# Patient Record
Sex: FEMALE | Race: WHITE | Employment: FULL TIME | URBAN - METROPOLITAN AREA
[De-identification: names, ages, dates, MRNs, and addresses within clinical notes are randomized per-mention and may not be internally consistent; named-entity substitution may affect disease eponyms.]

---

## 2020-09-15 ENCOUNTER — HOSPITAL ENCOUNTER (OUTPATIENT)
Facility: CLINIC | Age: 50
Setting detail: OBSERVATION
Discharge: HOME | End: 2020-09-16
Attending: EMERGENCY MEDICINE | Admitting: INTERNAL MEDICINE
Payer: COMMERCIAL

## 2020-09-15 ENCOUNTER — APPOINTMENT (OUTPATIENT)
Dept: CT IMAGING | Facility: CLINIC | Age: 50
End: 2020-09-15
Attending: EMERGENCY MEDICINE
Payer: COMMERCIAL

## 2020-09-15 DIAGNOSIS — N20.0 NEPHROLITHIASIS: Primary | ICD-10-CM

## 2020-09-15 DIAGNOSIS — N13.2 HYDRONEPHROSIS WITH URINARY OBSTRUCTION DUE TO URETERAL CALCULUS: ICD-10-CM

## 2020-09-15 DIAGNOSIS — N20.0 KIDNEY STONE: ICD-10-CM

## 2020-09-15 LAB
ALBUMIN UR-MCNC: NEGATIVE MG/DL
AMORPH CRY #/AREA URNS HPF: ABNORMAL /HPF
ANION GAP SERPL CALCULATED.3IONS-SCNC: 4 MMOL/L (ref 3–14)
APPEARANCE UR: CLEAR
BASOPHILS # BLD AUTO: 0 10E9/L (ref 0–0.2)
BASOPHILS NFR BLD AUTO: 0.3 %
BILIRUB UR QL STRIP: NEGATIVE
BUN SERPL-MCNC: 16 MG/DL (ref 7–30)
CALCIUM SERPL-MCNC: 8.5 MG/DL (ref 8.5–10.1)
CHLORIDE SERPL-SCNC: 109 MMOL/L (ref 94–109)
CO2 SERPL-SCNC: 26 MMOL/L (ref 20–32)
COLOR UR AUTO: YELLOW
CREAT SERPL-MCNC: 0.9 MG/DL (ref 0.52–1.04)
DIFFERENTIAL METHOD BLD: NORMAL
EOSINOPHIL # BLD AUTO: 0.1 10E9/L (ref 0–0.7)
EOSINOPHIL NFR BLD AUTO: 0.6 %
ERYTHROCYTE [DISTWIDTH] IN BLOOD BY AUTOMATED COUNT: 12.7 % (ref 10–15)
GFR SERPL CREATININE-BSD FRML MDRD: 74 ML/MIN/{1.73_M2}
GLUCOSE SERPL-MCNC: 147 MG/DL (ref 70–99)
GLUCOSE UR STRIP-MCNC: NEGATIVE MG/DL
HCT VFR BLD AUTO: 42 % (ref 35–47)
HGB BLD-MCNC: 13.8 G/DL (ref 11.7–15.7)
HGB UR QL STRIP: ABNORMAL
IMM GRANULOCYTES # BLD: 0 10E9/L (ref 0–0.4)
IMM GRANULOCYTES NFR BLD: 0.2 %
KETONES UR STRIP-MCNC: NEGATIVE MG/DL
LEUKOCYTE ESTERASE UR QL STRIP: ABNORMAL
LYMPHOCYTES # BLD AUTO: 1.6 10E9/L (ref 0.8–5.3)
LYMPHOCYTES NFR BLD AUTO: 16.4 %
MAGNESIUM SERPL-MCNC: 2.2 MG/DL (ref 1.6–2.3)
MCH RBC QN AUTO: 30.9 PG (ref 26.5–33)
MCHC RBC AUTO-ENTMCNC: 32.9 G/DL (ref 31.5–36.5)
MCV RBC AUTO: 94 FL (ref 78–100)
MONOCYTES # BLD AUTO: 0.6 10E9/L (ref 0–1.3)
MONOCYTES NFR BLD AUTO: 5.9 %
MUCOUS THREADS #/AREA URNS LPF: PRESENT /LPF
NEUTROPHILS # BLD AUTO: 7.4 10E9/L (ref 1.6–8.3)
NEUTROPHILS NFR BLD AUTO: 76.6 %
NITRATE UR QL: NEGATIVE
NRBC # BLD AUTO: 0 10*3/UL
NRBC BLD AUTO-RTO: 0 /100
PH UR STRIP: 5 PH (ref 5–7)
PLATELET # BLD AUTO: 250 10E9/L (ref 150–450)
POTASSIUM SERPL-SCNC: 3.3 MMOL/L (ref 3.4–5.3)
RBC # BLD AUTO: 4.46 10E12/L (ref 3.8–5.2)
RBC #/AREA URNS AUTO: 3 /HPF (ref 0–2)
SODIUM SERPL-SCNC: 139 MMOL/L (ref 133–144)
SOURCE: ABNORMAL
SP GR UR STRIP: 1.03 (ref 1–1.03)
UROBILINOGEN UR STRIP-MCNC: 0.2 MG/DL (ref 0–2)
WBC # BLD AUTO: 9.6 10E9/L (ref 4–11)
WBC #/AREA URNS AUTO: 0 /HPF (ref 0–5)

## 2020-09-15 PROCEDURE — 25000128 H RX IP 250 OP 636: Performed by: EMERGENCY MEDICINE

## 2020-09-15 PROCEDURE — 96361 HYDRATE IV INFUSION ADD-ON: CPT

## 2020-09-15 PROCEDURE — 96375 TX/PRO/DX INJ NEW DRUG ADDON: CPT

## 2020-09-15 PROCEDURE — 85025 COMPLETE CBC W/AUTO DIFF WBC: CPT | Performed by: EMERGENCY MEDICINE

## 2020-09-15 PROCEDURE — 99220 ZZC INITIAL OBSERVATION CARE,LEVL III: CPT | Performed by: INTERNAL MEDICINE

## 2020-09-15 PROCEDURE — 96376 TX/PRO/DX INJ SAME DRUG ADON: CPT

## 2020-09-15 PROCEDURE — 84132 ASSAY OF SERUM POTASSIUM: CPT | Performed by: INTERNAL MEDICINE

## 2020-09-15 PROCEDURE — 80048 BASIC METABOLIC PNL TOTAL CA: CPT | Performed by: EMERGENCY MEDICINE

## 2020-09-15 PROCEDURE — 81001 URINALYSIS AUTO W/SCOPE: CPT | Performed by: EMERGENCY MEDICINE

## 2020-09-15 PROCEDURE — 99285 EMERGENCY DEPT VISIT HI MDM: CPT | Mod: 25

## 2020-09-15 PROCEDURE — 96374 THER/PROPH/DIAG INJ IV PUSH: CPT

## 2020-09-15 PROCEDURE — 25000132 ZZH RX MED GY IP 250 OP 250 PS 637: Performed by: EMERGENCY MEDICINE

## 2020-09-15 PROCEDURE — 74176 CT ABD & PELVIS W/O CONTRAST: CPT

## 2020-09-15 PROCEDURE — 83735 ASSAY OF MAGNESIUM: CPT | Performed by: EMERGENCY MEDICINE

## 2020-09-15 PROCEDURE — G0378 HOSPITAL OBSERVATION PER HR: HCPCS

## 2020-09-15 PROCEDURE — 25800030 ZZH RX IP 258 OP 636: Performed by: EMERGENCY MEDICINE

## 2020-09-15 PROCEDURE — 25000132 ZZH RX MED GY IP 250 OP 250 PS 637: Performed by: NURSE PRACTITIONER

## 2020-09-15 PROCEDURE — 25000128 H RX IP 250 OP 636: Performed by: NURSE PRACTITIONER

## 2020-09-15 PROCEDURE — 25000128 H RX IP 250 OP 636: Performed by: INTERNAL MEDICINE

## 2020-09-15 PROCEDURE — 25800030 ZZH RX IP 258 OP 636: Performed by: NURSE PRACTITIONER

## 2020-09-15 RX ORDER — ACETAMINOPHEN 325 MG/1
650 TABLET ORAL EVERY 4 HOURS PRN
Status: DISCONTINUED | OUTPATIENT
Start: 2020-09-15 | End: 2020-09-16 | Stop reason: HOSPADM

## 2020-09-15 RX ORDER — HYDROMORPHONE HYDROCHLORIDE 1 MG/ML
0.5 INJECTION, SOLUTION INTRAMUSCULAR; INTRAVENOUS; SUBCUTANEOUS
Status: COMPLETED | OUTPATIENT
Start: 2020-09-15 | End: 2020-09-15

## 2020-09-15 RX ORDER — POTASSIUM CHLORIDE 1500 MG/1
20-40 TABLET, EXTENDED RELEASE ORAL
Status: DISCONTINUED | OUTPATIENT
Start: 2020-09-15 | End: 2020-09-16 | Stop reason: HOSPADM

## 2020-09-15 RX ORDER — POTASSIUM CHLORIDE 1.5 G/1.58G
20-40 POWDER, FOR SOLUTION ORAL
Status: DISCONTINUED | OUTPATIENT
Start: 2020-09-15 | End: 2020-09-16 | Stop reason: HOSPADM

## 2020-09-15 RX ORDER — SODIUM CHLORIDE 9 MG/ML
INJECTION, SOLUTION INTRAVENOUS CONTINUOUS
Status: DISCONTINUED | OUTPATIENT
Start: 2020-09-15 | End: 2020-09-16

## 2020-09-15 RX ORDER — TAMSULOSIN HYDROCHLORIDE 0.4 MG/1
0.4 CAPSULE ORAL ONCE
Status: COMPLETED | OUTPATIENT
Start: 2020-09-15 | End: 2020-09-15

## 2020-09-15 RX ORDER — MULTIPLE VITAMINS W/ MINERALS TAB 9MG-400MCG
1 TAB ORAL DAILY
Status: ON HOLD | COMMUNITY
End: 2020-09-16

## 2020-09-15 RX ORDER — FEXOFENADINE HCL 180 MG/1
180 TABLET ORAL DAILY
Status: ON HOLD | COMMUNITY
End: 2020-09-16

## 2020-09-15 RX ORDER — KETOROLAC TROMETHAMINE 30 MG/ML
30 INJECTION, SOLUTION INTRAMUSCULAR; INTRAVENOUS EVERY 6 HOURS PRN
Status: DISCONTINUED | OUTPATIENT
Start: 2020-09-15 | End: 2020-09-15

## 2020-09-15 RX ORDER — HYDROMORPHONE HYDROCHLORIDE 1 MG/ML
0.5 INJECTION, SOLUTION INTRAMUSCULAR; INTRAVENOUS; SUBCUTANEOUS
Status: DISCONTINUED | OUTPATIENT
Start: 2020-09-15 | End: 2020-09-16 | Stop reason: HOSPADM

## 2020-09-15 RX ORDER — ONDANSETRON 2 MG/ML
4 INJECTION INTRAMUSCULAR; INTRAVENOUS EVERY 6 HOURS PRN
Status: DISCONTINUED | OUTPATIENT
Start: 2020-09-15 | End: 2020-09-16 | Stop reason: HOSPADM

## 2020-09-15 RX ORDER — TAMSULOSIN HYDROCHLORIDE 0.4 MG/1
0.4 CAPSULE ORAL DAILY
Status: DISCONTINUED | OUTPATIENT
Start: 2020-09-16 | End: 2020-09-16 | Stop reason: HOSPADM

## 2020-09-15 RX ORDER — MAGNESIUM SULFATE HEPTAHYDRATE 40 MG/ML
4 INJECTION, SOLUTION INTRAVENOUS EVERY 4 HOURS PRN
Status: DISCONTINUED | OUTPATIENT
Start: 2020-09-15 | End: 2020-09-16 | Stop reason: HOSPADM

## 2020-09-15 RX ORDER — POTASSIUM CHLORIDE 7.45 MG/ML
10 INJECTION INTRAVENOUS
Status: DISCONTINUED | OUTPATIENT
Start: 2020-09-15 | End: 2020-09-16 | Stop reason: HOSPADM

## 2020-09-15 RX ORDER — SODIUM CHLORIDE 9 MG/ML
INJECTION, SOLUTION INTRAVENOUS CONTINUOUS
Status: DISCONTINUED | OUTPATIENT
Start: 2020-09-15 | End: 2020-09-15 | Stop reason: DRUGHIGH

## 2020-09-15 RX ORDER — POTASSIUM CL/LIDO/0.9 % NACL 10MEQ/0.1L
10 INTRAVENOUS SOLUTION, PIGGYBACK (ML) INTRAVENOUS
Status: DISCONTINUED | OUTPATIENT
Start: 2020-09-15 | End: 2020-09-16 | Stop reason: HOSPADM

## 2020-09-15 RX ORDER — ACETAMINOPHEN 650 MG/1
650 SUPPOSITORY RECTAL EVERY 4 HOURS PRN
Status: DISCONTINUED | OUTPATIENT
Start: 2020-09-15 | End: 2020-09-16 | Stop reason: HOSPADM

## 2020-09-15 RX ORDER — ONDANSETRON 2 MG/ML
4 INJECTION INTRAMUSCULAR; INTRAVENOUS EVERY 30 MIN PRN
Status: DISCONTINUED | OUTPATIENT
Start: 2020-09-15 | End: 2020-09-15 | Stop reason: DRUGHIGH

## 2020-09-15 RX ORDER — HYDROCODONE BITARTRATE AND ACETAMINOPHEN 5; 325 MG/1; MG/1
1 TABLET ORAL ONCE
Status: COMPLETED | OUTPATIENT
Start: 2020-09-15 | End: 2020-09-15

## 2020-09-15 RX ORDER — NALOXONE HYDROCHLORIDE 0.4 MG/ML
.1-.4 INJECTION, SOLUTION INTRAMUSCULAR; INTRAVENOUS; SUBCUTANEOUS
Status: DISCONTINUED | OUTPATIENT
Start: 2020-09-15 | End: 2020-09-16 | Stop reason: HOSPADM

## 2020-09-15 RX ORDER — BIOTIN 10000 MCG
CAPSULE ORAL
COMMUNITY
End: 2020-09-15

## 2020-09-15 RX ORDER — TAMSULOSIN HYDROCHLORIDE 0.4 MG/1
0.4 CAPSULE ORAL DAILY
Qty: 10 CAPSULE | Refills: 0 | Status: SHIPPED | OUTPATIENT
Start: 2020-09-15 | End: 2020-09-16

## 2020-09-15 RX ORDER — MAGNESIUM SULFATE HEPTAHYDRATE 40 MG/ML
2 INJECTION, SOLUTION INTRAVENOUS DAILY PRN
Status: DISCONTINUED | OUTPATIENT
Start: 2020-09-15 | End: 2020-09-16 | Stop reason: HOSPADM

## 2020-09-15 RX ORDER — POTASSIUM CHLORIDE 29.8 MG/ML
20 INJECTION INTRAVENOUS
Status: DISCONTINUED | OUTPATIENT
Start: 2020-09-15 | End: 2020-09-16 | Stop reason: HOSPADM

## 2020-09-15 RX ORDER — HYDROMORPHONE HYDROCHLORIDE 1 MG/ML
0.2 INJECTION, SOLUTION INTRAMUSCULAR; INTRAVENOUS; SUBCUTANEOUS
Status: DISCONTINUED | OUTPATIENT
Start: 2020-09-15 | End: 2020-09-15

## 2020-09-15 RX ORDER — HYDROCODONE BITARTRATE AND ACETAMINOPHEN 5; 325 MG/1; MG/1
1 TABLET ORAL EVERY 6 HOURS PRN
Qty: 10 TABLET | Refills: 0 | Status: SHIPPED | OUTPATIENT
Start: 2020-09-15 | End: 2020-09-16

## 2020-09-15 RX ORDER — ONDANSETRON 4 MG/1
4 TABLET, ORALLY DISINTEGRATING ORAL EVERY 8 HOURS PRN
Qty: 10 TABLET | Refills: 0 | Status: SHIPPED | OUTPATIENT
Start: 2020-09-15 | End: 2020-09-16

## 2020-09-15 RX ORDER — KETOROLAC TROMETHAMINE 30 MG/ML
30 INJECTION, SOLUTION INTRAMUSCULAR; INTRAVENOUS ONCE
Status: COMPLETED | OUTPATIENT
Start: 2020-09-15 | End: 2020-09-15

## 2020-09-15 RX ORDER — GLUCOSAMINE/D3/BOSWELLIA SERRA 1500MG-400
10000 TABLET ORAL EVERY MORNING
Status: ON HOLD | COMMUNITY
End: 2020-09-16

## 2020-09-15 RX ORDER — ONDANSETRON 4 MG/1
4 TABLET, ORALLY DISINTEGRATING ORAL EVERY 6 HOURS PRN
Status: DISCONTINUED | OUTPATIENT
Start: 2020-09-15 | End: 2020-09-16 | Stop reason: HOSPADM

## 2020-09-15 RX ADMIN — ONDANSETRON 4 MG: 2 INJECTION INTRAMUSCULAR; INTRAVENOUS at 15:54

## 2020-09-15 RX ADMIN — ACETAMINOPHEN 650 MG: 325 TABLET, FILM COATED ORAL at 19:24

## 2020-09-15 RX ADMIN — Medication 10 MEQ: at 16:54

## 2020-09-15 RX ADMIN — KETOROLAC TROMETHAMINE 30 MG: 30 INJECTION, SOLUTION INTRAMUSCULAR at 08:35

## 2020-09-15 RX ADMIN — HYDROMORPHONE HYDROCHLORIDE 0.2 MG: 1 INJECTION, SOLUTION INTRAMUSCULAR; INTRAVENOUS; SUBCUTANEOUS at 15:51

## 2020-09-15 RX ADMIN — ONDANSETRON 4 MG: 2 INJECTION INTRAMUSCULAR; INTRAVENOUS at 10:10

## 2020-09-15 RX ADMIN — HYDROMORPHONE HYDROCHLORIDE 0.5 MG: 1 INJECTION, SOLUTION INTRAMUSCULAR; INTRAVENOUS; SUBCUTANEOUS at 08:06

## 2020-09-15 RX ADMIN — HYDROMORPHONE HYDROCHLORIDE 0.5 MG: 1 INJECTION, SOLUTION INTRAMUSCULAR; INTRAVENOUS; SUBCUTANEOUS at 10:11

## 2020-09-15 RX ADMIN — HYDROMORPHONE HYDROCHLORIDE 0.3 MG: 1 INJECTION, SOLUTION INTRAMUSCULAR; INTRAVENOUS; SUBCUTANEOUS at 16:25

## 2020-09-15 RX ADMIN — TAMSULOSIN HYDROCHLORIDE 0.4 MG: 0.4 CAPSULE ORAL at 11:32

## 2020-09-15 RX ADMIN — ONDANSETRON 4 MG: 2 INJECTION INTRAMUSCULAR; INTRAVENOUS at 08:06

## 2020-09-15 RX ADMIN — Medication 10 MEQ: at 19:37

## 2020-09-15 RX ADMIN — HYDROCODONE BITARTRATE AND ACETAMINOPHEN 1 TABLET: 5; 325 TABLET ORAL at 11:32

## 2020-09-15 RX ADMIN — Medication 10 MEQ: at 15:31

## 2020-09-15 RX ADMIN — Medication 10 MEQ: at 18:18

## 2020-09-15 RX ADMIN — SODIUM CHLORIDE: 9 INJECTION, SOLUTION INTRAVENOUS at 14:25

## 2020-09-15 RX ADMIN — HYDROMORPHONE HYDROCHLORIDE 0.5 MG: 1 INJECTION, SOLUTION INTRAMUSCULAR; INTRAVENOUS; SUBCUTANEOUS at 10:46

## 2020-09-15 RX ADMIN — SODIUM CHLORIDE 1000 ML: 9 INJECTION, SOLUTION INTRAVENOUS at 09:29

## 2020-09-15 ASSESSMENT — ENCOUNTER SYMPTOMS
BLOOD IN STOOL: 0
DIFFICULTY URINATING: 1
FEVER: 0
DIARRHEA: 1
FLANK PAIN: 1

## 2020-09-15 NOTE — PHARMACY-ADMISSION MEDICATION HISTORY
Pharmacy Medication History  Admission medication history interview status for the 9/15/2020  admission is complete. See EPIC admission navigator for prior to admission medications     Medication history sources: Patient  Medication history source reliability: Good  Adherence assessment: Good    Significant changes made to the medication list:        Additional medication history information:       Medication reconciliation completed by provider prior to medication history? No    Time spent in this activity: 5 min      Prior to Admission medications    Medication Sig Last Dose Taking? Auth Provider   Biotin 76539 MCG TABS Take 10,000 mcg by mouth every morning 9/14/2020 at Unknown time Yes Unknown, Entered By History   fexofenadine (ALLEGRA) 180 MG tablet Take 180 mg by mouth daily 9/14/2020 at Unknown time Yes Unknown, Entered By History   HYDROcodone-acetaminophen (NORCO) 5-325 MG tablet Take 1 tablet by mouth every 6 hours as needed for severe pain  Yes Michelle Chang MD   multivitamin w/minerals (THERA-VIT-M) tablet Take 1 tablet by mouth daily 9/14/2020 at am Yes Unknown, Entered By History   ondansetron (ZOFRAN ODT) 4 MG ODT tab Take 1 tablet (4 mg) by mouth every 8 hours as needed for nausea  Yes Michelle Chang MD   tamsulosin (FLOMAX) 0.4 MG capsule Take 1 capsule (0.4 mg) by mouth daily for 10 doses  Yes Michelle Chang MD

## 2020-09-15 NOTE — PROGRESS NOTES
RECEIVING UNIT ED HANDOFF REVIEW    ED Nurse Handoff Report was reviewed by: Bia Riojas RN on September 15, 2020 at 1:50 PM

## 2020-09-15 NOTE — ED NOTES
Call light answered. Pt. requesting more pain medication due to increasing left flank pain and is feeling nauseous. RN notified.

## 2020-09-15 NOTE — ED PROVIDER NOTES
History   Chief Complaint  Flank Pain    The history is provided by the patient.      Roxanne Machado is a 49 year old female who presents for evaluation of left flank pain and difficulty with urination since 5 AM this morning. The pain has progressively worsened throughout the morning. She has accompanied nausea and a couple episodes of diarrhea this morning, but denies fevers. She denies blood in stool as well, but states she is on her period currently. She did not have any symptoms while going to bed last night. There was no trauma to her back. She has no history of kidney stones or abdominal surgeries.     Allergies  Morphine  Penicillins    Medications  The patient is not currently on any daily prescription medications.    Past Medical History  Basal cell carcinoma  Varicose veins     Past Surgical History  Tonsillectomy and adenoidectomy     Family History  Father - CAD, Parkinson's,   Mother - breast cancer, high cholesterol  Brother(s) - MI, CAD  Sister - seizure disorder  Son - Autism     Social History  The patient was unaccompanied to the ED.  Smoking Status: never  Smokeless Tobacco: never  Alcohol Use: yes    Review of Systems   Constitutional: Negative for fever.   Gastrointestinal: Positive for diarrhea. Negative for blood in stool.   Genitourinary: Positive for difficulty urinating and flank pain.   All other systems reviewed and are negative.      Physical Exam     Patient Vitals for the past 24 hrs:   BP Temp Temp src Pulse Resp SpO2   09/15/20 1048 -- -- -- -- -- 100 %   09/15/20 1047 120/76 -- -- 58 -- (!) 81 %   09/15/20 0812 129/81 97.7  F (36.5  C) Oral 79 24 100 %       Physical Exam  General: Patient is alert and comfortable appearing.  HEENT: Head atraumatic    Eyes: pupils equal and reactive. Conjunctiva clear   Nares: patent   Oropharynx: no lesions, uvula midline, no palatal draping, normal voice, no trismus  Neck: Supple without lymphadenopathy, no meningismus  Chest: Heart regular  rate and rhythm.   Lungs: Equal clear to auscultation with no wheeze or rales  Abdomen: Soft, non tender, nondistended, normal bowel sounds  Back: Left costovertebral angle tenderness, no midline C, T or L spine tenderness  Neuro: Grossly nonfocal, normal speech, strength equal bilaterally, CN 2-12 intact  Extremities: No deformities, equal radial and DP pulses. No clubbing, cyanosis.  No edema  Skin: Warm and dry with no rash.       Emergency Department Course   Imaging:  Radiology findings were communicated with the patient who voiced understanding of the findings.    CT abdomen pelvis w/o contrast:   IMPRESSION:   1.  4 mm calcification is noted in the region of the left  ureterovesicular junction which is suspicious for a stone at the left  UVJ. The bladder is decompressed and the ureter is hard to visualize  within this region for definite confirmation. There is mild left  hydroureter and mild left hydronephrosis suggesting a recently passed  stone. 1 mm nonobstructive calculus is noted in the inferior pole of  the left kidney.  Readings per Radiology    Laboratory:  Laboratory findings were communicated with the patient who voiced understanding of the findings.    CBC: AWNL (WBC 9.6, HGB 13.8, )  BMP: potassium 3.3 (L), glucose 147 (H) o/w WNL (Creatinine 0.90)    UA with Microscopic: blood small (A), leukocyte esterase trace (A), RBC/HPF 3 (H), mucous present (A), amorphous crystals many (A) o/w WNL    Interventions:  0806 Dilaudid 0.5 mg IV  0806 Zofran 4 mg IV  0835 Toradol 30 mg IV  0929 NS 1L IV Bolus    Emergency Department Course:  Past medical records, nursing notes, and vitals reviewed.    0740 I physically examined the patient as documented above.    IV was inserted and blood was drawn for laboratory testing, results above.    The patient provided a urine sample here in the emergency department. This was sent for laboratory testing, findings above.    The patient was sent for radiographs while  in the emergency department, results above.     0902 I rechecked the patient and discussed the findings of their workup thus far.    1142 I rechecked the patient and she is still not feeling well.     1224 I consulted with Dr. Hu of hospitalist service.      Findings and plan explained to the Patient who consents to admission. Discussed the patient with Dr. Hu, who will admit the patient to an observation bed for further monitoring, evaluation, and treatment.    I personally reviewed the laboratory and imaging results with the Patient and answered all related questions prior to admission.     Impression & Plan   Medical Decision Making:  Roxanne Machado is a 49 year old female who presented with unilateral flank & abdominal pain consistent with renal colic. CT confirms a 4 mm ureteral stone at the left UVJ.  Renal function is normal/baseline.  CT and lab workup show no other alternative etiology that could be causing her symptoms (e.g., AAA, appendicitis, pyelonephritis). There is no fever or convincing evidence of a urinary tract infection.    Despite multiple attempts at pain control and nausea control the patient continues to have intractable pain and nausea and vomiting.  Given this we will plan to admit her to observation unit for continued symptom control.  Patient remains hemodynamically stable and afebrile.  If she fails to improve over the next short-term urology could be consulted as well.  Patient is comfortable with this plan and all questions and concerns addressed.    Diagnosis:    ICD-10-CM    1. Kidney stone  N20.0    2. Hydronephrosis with urinary obstruction due to ureteral calculus  N13.2      Disposition:  Admitted to observation bed under care of Dr. Hu.    Scribe Disclosure:  I, Allison Guardado, am serving as a scribe at 7:40 AM on 9/15/2020 to document services personally performed by Michelle Chang MD based on my observations and the provider's statements to me.       Michelle Chang MD  09/15/20 0510

## 2020-09-15 NOTE — H&P
[facility]    History and Physical - Hospitalist Service       Date of Admission:  9/15/2020    Assessment & Plan   Roxanne Machado is a 49 year old female admitted on 9/15/2020. She presents to the emergency department with left flank pain, difficulty urinating, nausea/vomiting.  No significant past medical history.    Nephrolithiasis   CT imaging confirms 4 mm calcification in the left ureterovesicular junction with mild left hydroureter and mild left hydronephrosis suggesting recently passed stone.  UA shows small amounts of blood, trace leukocyte esterase, UCx pending.   --Supportive measures including pain control (toradol and low dose dilaudid), IV hydration, antiemetics  --Start tamsulosin, strain urine  --Was unable to tolerate PO and had recurrent pain despite supportive management - will consult urology     Mild hypokalemia  Likely in the setting of decreased oral intake, nausea/vomiting.  --Replace potassium per protocol     Diet: clear liquids   DVT Prophylaxis: Low Risk/Ambulatory with no VTE prophylaxis indicated  Ventura Catheter: not present  Code Status: Full Code         Disposition Plan   Expected discharge: Tomorrow, recommended to prior living arrangement once adequate pain management/ tolerating PO medications.  Entered: FABIAN Garber CNP 09/15/2020, 12:33 PM     The patient's care was discussed with the Attending Physician, Dr. Chevy Hu, Bedside Nurse and Patient.    FABIAN Garber CNP  [facility]    ______________________________________________________________________    Chief Complaint   Left flank pain, nausea/vomiting, diarrhea    History is obtained from the patient    History of Present Illness   Roxanne Machado is a 49 year old female who presents to the emergency department with left flank pain, difficulty urinating, nausea/vomiting.  No significant past medical history.    Patient notes increasing left flank pain and difficulty  urinating since 5 AM this morning.  Pain has significantly worsened and is now associated with nausea/diarrhea.  Patient has not noticed any fever/chills, constipation, dyspnea, cough.    Evaluated patient in the emergency room after her work-up which was significant for CT imaging showing 4 mm stone in the ureterovesicular junction.  UA abnormal, UC pending-does not suggest infection.  Patient appears ill, despite supportive cares.  She attempted oral Flomax and pain control but vomited and had a second bout of pain, prompting plan for admission    Review of Systems    The 10 point Review of Systems is negative other than noted in the HPI or here.     Past Medical History    I have reviewed this patient's medical history and updated it with pertinent information if needed.   History reviewed. No pertinent past medical history.    Past Surgical History   I have reviewed this patient's surgical history and updated it with pertinent information if needed.  History reviewed. No pertinent surgical history.    Social History   I have reviewed this patient's social history and updated it with pertinent information if needed.  Social History     Tobacco Use     Smoking status: Never Smoker     Smokeless tobacco: Never Used   Substance Use Topics     Alcohol use: Yes     Comment: occasional     Drug use: Not Currently       Family History   No significant family history, including no history of: none    Prior to Admission Medications   Prior to Admission Medications   Prescriptions Last Dose Informant Patient Reported? Taking?   Biotin 10 MG CAPS   Yes Yes   Pediatric Multivitamins-Fl (MULTIVITAMIN WITH 1 MG FLUORIDE) 1 MG CHEW   Yes Yes   Sig: Take 1 tablet by mouth daily      Facility-Administered Medications: None     Allergies   Allergies   Allergen Reactions     Morphine      Penicillins        Physical Exam   Vital Signs: Temp: 97.7  F (36.5  C) Temp src: Oral BP: 120/76 Pulse: 58   Resp: 24 SpO2: 100 % O2 Device:  Nasal cannula Oxygen Delivery: 2 LPM  Weight: 0 lbs 0 oz     Physical Exam  Vitals signs and nursing note reviewed.   Constitutional:       General: She is not in acute distress.     Appearance: Normal appearance. She is ill-appearing.   HENT:      Head: Normocephalic.   Cardiovascular:      Rate and Rhythm: Normal rate and regular rhythm.      Heart sounds: Normal heart sounds.   Pulmonary:      Effort: Pulmonary effort is normal.      Breath sounds: Normal breath sounds and air entry.   Abdominal:      General: Abdomen is flat.      Palpations: Abdomen is soft.      Tenderness: There is no abdominal tenderness. There is no right CVA tenderness or left CVA tenderness.   Musculoskeletal: Normal range of motion.      Right lower leg: No edema.      Left lower leg: No edema.   Skin:     General: Skin is warm and dry.   Neurological:      General: No focal deficit present.      Mental Status: She is alert.   Psychiatric:         Attention and Perception: Attention normal.         Mood and Affect: Mood normal.         Speech: Speech normal.         Behavior: Behavior is cooperative.           Data   Data reviewed today: I reviewed all medications, new labs and imaging results over the last 24 hours. I personally reviewed the abdominal CT image(s) showing 4mm stone at the UPJ .    Recent Labs   Lab 09/15/20  0802   WBC 9.6   HGB 13.8   MCV 94         POTASSIUM 3.3*   CHLORIDE 109   CO2 26   BUN 16   CR 0.90   ANIONGAP 4   ABBEY 8.5   *     Recent Results (from the past 24 hour(s))   CT Abdomen Pelvis w/o Contrast    Narrative    CT ABDOMEN PELVIS W/O CONTRAST 9/15/2020 8:41 AM    CLINICAL HISTORY: Flank pain, stone disease suspected - left  TECHNIQUE: CT scan of the abdomen and pelvis was performed without IV  contrast. Multiplanar reformats were obtained. Dose reduction  techniques were used.  CONTRAST: None.    COMPARISON: None.    FINDINGS:   LOWER CHEST: Normal.    HEPATOBILIARY: Liver and  gallbladder are within normal limits.    PANCREAS: Normal.    SPLEEN: Multiple calcifications are noted within the spleen secondary  to granulomatous disease.    ADRENAL GLANDS: Normal.    KIDNEYS/BLADDER: There is a 4 mm calcification in the region of the  left UVJ which is suspicious for a stone at the left UVJ. The ureter  is hard to follow within this region for definite confirmation and the  bladder is collapsed limiting evaluation. Very mild left hydroureter  and mild left hydronephrosis. 1 mm nonobstructive stone noted in the  inferior pole of the left kidney.    BOWEL: Normal.    LYMPH NODES: Normal.    VASCULATURE: Unremarkable.    PELVIC ORGANS: Bladder is decompressed.    OTHER: None.    MUSCULOSKELETAL: Minimal degenerative changes of the spine.      Impression    IMPRESSION:   1.  4 mm calcification is noted in the region of the left  ureterovesicular junction which is suspicious for a stone at the left  UVJ. The bladder is decompressed and the ureter is hard to visualize  within this region for definite confirmation. There is mild left  hydroureter and mild left hydronephrosis suggesting a recently passed  stone. 1 mm nonobstructive calculus is noted in the inferior pole of  the left kidney.    EBONIE ARANGO MD

## 2020-09-15 NOTE — ED NOTES
[facility]  ED Nurse Handoff Report    ED Chief complaint: Flank Pain      ED Diagnosis:   Final diagnoses:   Kidney stone   Hydronephrosis with urinary obstruction due to ureteral calculus       Code Status: Full Code    Allergies:   Allergies   Allergen Reactions     Morphine      Penicillins        Patient Story: Pt arrives w/ c/o flank pain that started this morning. N/V as well  Focused Assessment:  L flank pain, n/v, difficulty urinating    Treatments and/or interventions provided: IVF, labs, meds, ct scan  Patient's response to treatments and/or interventions: less pain and nausea    To be done/followed up on inpatient unit:  pain control and nausea control    Does this patient have any cognitive concerns?: none    Activity level - Baseline/Home:  Independent  Activity Level - Current:   Independent    Patient's Preferred language: English   Needed?: No    Isolation: None  Infection: Not Applicable  Bariatric?: No    Vital Signs:   Vitals:    09/15/20 0812 09/15/20 1047 09/15/20 1048   BP: 129/81 120/76    Pulse: 79 58    Resp: 24     Temp: 97.7  F (36.5  C)     TempSrc: Oral     SpO2: 100% (!) 81% 100%       Cardiac Rhythm:     Was the PSS-3 completed:   Yes  What interventions are required if any?               Family Comments: n/a  OBS brochure/video discussed/provided to patient/family: N/A              Name of person given brochure if not patient: n/a              Relationship to patient: n/a    For the majority of the shift this patient's behavior was Green.   Behavioral interventions performed were non3.    ED NURSE PHONE NUMBER: [phone]

## 2020-09-16 VITALS
TEMPERATURE: 98.1 F | RESPIRATION RATE: 16 BRPM | SYSTOLIC BLOOD PRESSURE: 118 MMHG | HEART RATE: 78 BPM | DIASTOLIC BLOOD PRESSURE: 56 MMHG | OXYGEN SATURATION: 99 %

## 2020-09-16 LAB
ANION GAP SERPL CALCULATED.3IONS-SCNC: 6 MMOL/L (ref 3–14)
BUN SERPL-MCNC: 11 MG/DL (ref 7–30)
CALCIUM SERPL-MCNC: 7.7 MG/DL (ref 8.5–10.1)
CHLORIDE SERPL-SCNC: 112 MMOL/L (ref 94–109)
CO2 SERPL-SCNC: 21 MMOL/L (ref 20–32)
CREAT SERPL-MCNC: 1.21 MG/DL (ref 0.52–1.04)
GFR SERPL CREATININE-BSD FRML MDRD: 51 ML/MIN/{1.73_M2}
GLUCOSE SERPL-MCNC: 94 MG/DL (ref 70–99)
POTASSIUM SERPL-SCNC: 4 MMOL/L (ref 3.4–5.3)
POTASSIUM SERPL-SCNC: 4.2 MMOL/L (ref 3.4–5.3)
SODIUM SERPL-SCNC: 139 MMOL/L (ref 133–144)

## 2020-09-16 PROCEDURE — 36415 COLL VENOUS BLD VENIPUNCTURE: CPT | Performed by: NURSE PRACTITIONER

## 2020-09-16 PROCEDURE — G0378 HOSPITAL OBSERVATION PER HR: HCPCS

## 2020-09-16 PROCEDURE — 80048 BASIC METABOLIC PNL TOTAL CA: CPT | Performed by: NURSE PRACTITIONER

## 2020-09-16 PROCEDURE — 25800030 ZZH RX IP 258 OP 636: Performed by: INTERNAL MEDICINE

## 2020-09-16 PROCEDURE — 25000132 ZZH RX MED GY IP 250 OP 250 PS 637: Performed by: NURSE PRACTITIONER

## 2020-09-16 PROCEDURE — 84132 ASSAY OF SERUM POTASSIUM: CPT | Performed by: INTERNAL MEDICINE

## 2020-09-16 PROCEDURE — 99217 ZZC OBSERVATION CARE DISCHARGE: CPT | Performed by: PHYSICIAN ASSISTANT

## 2020-09-16 PROCEDURE — 96376 TX/PRO/DX INJ SAME DRUG ADON: CPT

## 2020-09-16 PROCEDURE — 25800030 ZZH RX IP 258 OP 636: Performed by: PHYSICIAN ASSISTANT

## 2020-09-16 PROCEDURE — 36415 COLL VENOUS BLD VENIPUNCTURE: CPT | Performed by: INTERNAL MEDICINE

## 2020-09-16 PROCEDURE — 99204 OFFICE O/P NEW MOD 45 MIN: CPT | Performed by: UROLOGY

## 2020-09-16 PROCEDURE — 25000128 H RX IP 250 OP 636: Performed by: NURSE PRACTITIONER

## 2020-09-16 RX ORDER — TAMSULOSIN HYDROCHLORIDE 0.4 MG/1
0.4 CAPSULE ORAL DAILY
Qty: 10 CAPSULE | Refills: 0 | Status: SHIPPED | OUTPATIENT
Start: 2020-09-16

## 2020-09-16 RX ORDER — SODIUM CHLORIDE 9 MG/ML
INJECTION, SOLUTION INTRAVENOUS CONTINUOUS
Status: DISCONTINUED | OUTPATIENT
Start: 2020-09-16 | End: 2020-09-16 | Stop reason: HOSPADM

## 2020-09-16 RX ORDER — HYDROCODONE BITARTRATE AND ACETAMINOPHEN 5; 325 MG/1; MG/1
1 TABLET ORAL EVERY 6 HOURS PRN
Qty: 10 TABLET | Refills: 0 | Status: SHIPPED | OUTPATIENT
Start: 2020-09-16

## 2020-09-16 RX ORDER — ONDANSETRON 4 MG/1
4 TABLET, ORALLY DISINTEGRATING ORAL EVERY 8 HOURS PRN
Qty: 10 TABLET | Refills: 0 | Status: SHIPPED | OUTPATIENT
Start: 2020-09-16

## 2020-09-16 RX ADMIN — TAMSULOSIN HYDROCHLORIDE 0.4 MG: 0.4 CAPSULE ORAL at 09:13

## 2020-09-16 RX ADMIN — Medication 10 MEQ: at 03:18

## 2020-09-16 RX ADMIN — Medication 10 MEQ: at 04:28

## 2020-09-16 RX ADMIN — SODIUM CHLORIDE: 9 INJECTION, SOLUTION INTRAVENOUS at 03:19

## 2020-09-16 RX ADMIN — SODIUM CHLORIDE: 9 INJECTION, SOLUTION INTRAVENOUS at 09:13

## 2020-09-16 RX ADMIN — ACETAMINOPHEN 650 MG: 325 TABLET, FILM COATED ORAL at 08:27

## 2020-09-16 NOTE — PLAN OF CARE
VSS on RA. Minimal sleep overnight. K 4.0 after replacement, replaced again and recheck this AM. PIV infusing. Straining urine. No dilaudid or zofran overnight after emesis last donna. NPO since midnight. Plan for urology consult this AM.

## 2020-09-16 NOTE — PROGRESS NOTES
[facility]    Hospitalist Progress Note    Assessment & Plan   Roxanne Machado is a 49 year old female who was admitted on 9/15/2020.     No significant past medical history.  She was registered to observation due to nephrolithiasis with left flank pain and hypokalemia.      Nephrolithiasis:  CT imaging confirms 4 mm calcification in the left ureterovesicular junction with mild left hydroureter and mild left hydronephrosis suggesting recently passed stone.  UA shows small amounts of blood, trace leukocyte esterase, UCx pending.   --Supportive measures including pain control (toradol and low dose dilaudid), IV hydration, antiemetics.  --Start tamsulosin, strain urine.  --Consult urology.        TOMASZ:  Creatinine increased (1.21), previously 0.9.  CT scan with very mild left hydroureter and mild left hydronephrosis.    --Continue IV Fluids.    --Avoid nephrotoxic agents.      Mild hypokalemia:  Likely in the setting of decreased oral intake, nausea/vomiting.  Recent Labs   Lab 09/16/20  0018 09/15/20  0802   POTASSIUM 4.0 3.3*   --Monitor and replace per protocol.    COVID-19 Asymptomatic screening:  --PCR ordered.      Diet: NPO per Anesthesia Guidelines for Procedure/Surgery Except for: Meds     DVT Prophylaxis: Ambulate every shift   Ventura Catheter: not present  Code Status: Full Code     Disposition Plan    Expected discharge: Today versus tomorrow, recommended to prior living arrangement once awaiting Urology consult and recs, tolerating advacnement in diet and pain is adequately managed.   Entered: Harshil Rouse PA-C 09/16/2020, 5:16 AM          The patient has been discussed with Dr. Ham, who agrees with the assessment and plan at this time.  Dr. Ham will evaluate the patient independently.      Slim Rouse PA-C   [phone]    Interval History   Patient was resting in bed upon arrival.  She denied fever, chills, chest pain, shortness of breath or abdominal pain.  She  received pain medications last night that made her nauseated and she has not taken any since.      She does feel nauseated and stomach is unsettled.  Discussed plan for Urology consult and possible discharge today.  Advised patient to walk around the unit.      -Data reviewed today: I reviewed all new labs and imaging results over the last 24 hours. I personally reviewed no images or EKG's today.    Physical Exam   Temp: 97.1  F (36.2  C) Temp src: Oral BP: 121/76 Pulse: 62   Resp: 16 SpO2: 98 % O2 Device: None (Room air) Oxygen Delivery: 2 LPM  There were no vitals filed for this visit.  Vital Signs with Ranges  Temp:  [96.7  F (35.9  C)-98.3  F (36.8  C)] 97.1  F (36.2  C)  Pulse:  [58-79] 62  Resp:  [16-24] 16  BP: (105-129)/(45-81) 121/76  SpO2:  [81 %-100 %] 98 %  I/O last 3 completed shifts:  In: 120 [P.O.:120]  Out: 400 [Urine:100; Emesis/NG output:300]      Constitutional: Awake, alert, cooperative, no apparent distress.    ENT: Normocephalic, without obvious abnormality, atraumatic, oral pharynx with moist mucus membranes, tonsils without erythema or exudates.  Neck: Supple, symmetrical, trachea midline, no adenopathy.  Pulmonary: No increased work of breathing, good air exchange, clear to auscultation bilaterally, no crackles or wheezing.  Cardiovascular: Regular rate and rhythm, normal S1 and S2, no S3 or S4, and no murmur noted.  GI: Normal bowel sounds, soft, non-distended, non-tender.  Skin/Integumen: Clear.  Neuro: CN II-XII grossly intact.  Psych:  Alert and oriented x 3. Normal affect.  Extremities: No lower extremity edema noted, and calves are non-TTP bilaterally.       Medications       sodium chloride (PF)  3 mL Intracatheter Q8H     tamsulosin  0.4 mg Oral Daily       Data   Recent Labs   Lab 09/16/20  0711 09/16/20  0018 09/15/20  0802   WBC  --   --  9.6   HGB  --   --  13.8   MCV  --   --  94   PLT  --   --  250     --  139   POTASSIUM 4.2 4.0 3.3*   CHLORIDE 112*  --  109   CO2 21  --   26   BUN 11  --  16   CR 1.21*  --  0.90   ANIONGAP 6  --  4   ABBEY 7.7*  --  8.5   GLC 94  --  147*       Recent Results (from the past 24 hour(s))   CT Abdomen Pelvis w/o Contrast    Narrative    CT ABDOMEN PELVIS W/O CONTRAST 9/15/2020 8:41 AM    CLINICAL HISTORY: Flank pain, stone disease suspected - left  TECHNIQUE: CT scan of the abdomen and pelvis was performed without IV  contrast. Multiplanar reformats were obtained. Dose reduction  techniques were used.  CONTRAST: None.    COMPARISON: None.    FINDINGS:   LOWER CHEST: Normal.    HEPATOBILIARY: Liver and gallbladder are within normal limits.    PANCREAS: Normal.    SPLEEN: Multiple calcifications are noted within the spleen secondary  to granulomatous disease.    ADRENAL GLANDS: Normal.    KIDNEYS/BLADDER: There is a 4 mm calcification in the region of the  left UVJ which is suspicious for a stone at the left UVJ. The ureter  is hard to follow within this region for definite confirmation and the  bladder is collapsed limiting evaluation. Very mild left hydroureter  and mild left hydronephrosis. 1 mm nonobstructive stone noted in the  inferior pole of the left kidney.    BOWEL: Normal.    LYMPH NODES: Normal.    VASCULATURE: Unremarkable.    PELVIC ORGANS: Bladder is decompressed.    OTHER: None.    MUSCULOSKELETAL: Minimal degenerative changes of the spine.      Impression    IMPRESSION:   1.  4 mm calcification is noted in the region of the left  ureterovesicular junction which is suspicious for a stone at the left  UVJ. The bladder is decompressed and the ureter is hard to visualize  within this region for definite confirmation. There is mild left  hydroureter and mild left hydronephrosis suggesting a recently passed  stone. 1 mm nonobstructive calculus is noted in the inferior pole of  the left kidney.    EBONIE ARANGO MD

## 2020-09-16 NOTE — DISCHARGE SUMMARY
[facility]  Hospitalist Discharge Summary     Admit Date:  9/15/2020  Discharge Date:     9/16/2020  3:51 PM  Discharging Provider: Harshil Rouse PA-C    PRIMARY CARE PROVIDER:    Clinic, [facility]     DISCHARGE DIAGNOSES:   Nephrolithiasis (Left 4 mm left ureterovesicular stone) with associated hydroureter and hydronephrosis  TOMASZ  Mild Hypokalemia  COVID-19 asymptomatic screening     DISCHARGE MEDICATIONS:       Review of your medicines      START taking      Dose / Directions   HYDROcodone-acetaminophen 5-325 MG tablet  Commonly known as:  NORCO      Dose:  1 tablet  Take 1 tablet by mouth every 6 hours as needed for severe pain  Quantity:  10 tablet  Refills:  0     ondansetron 4 MG ODT tab  Commonly known as:  Zofran ODT      Dose:  4 mg  Take 1 tablet (4 mg) by mouth every 8 hours as needed for nausea  Quantity:  10 tablet  Refills:  0     tamsulosin 0.4 MG capsule  Commonly known as:  Flomax      Dose:  0.4 mg  Take 1 capsule (0.4 mg) by mouth daily for 10 doses  Quantity:  10 capsule  Refills:  0        STOP taking    Biotin 52100 MCG Tabs        fexofenadine 180 MG tablet  Commonly known as:  ALLEGRA        multivitamin w/minerals tablet              Where to get your medicines      Some of these will need a paper prescription and others can be bought over the counter. Ask your nurse if you have questions.    Bring a paper prescription for each of these medications    HYDROcodone-acetaminophen 5-325 MG tablet    ondansetron 4 MG ODT tab    tamsulosin 0.4 MG capsule        ALLERGIES:    Allergies   Allergen Reactions     Morphine      Penicillins        DISPOSITION:    Discharged to home  Condition at discharge: Stable        Reason for your hospital stay    You were at [facility] due to left sided flank pain and found to have a kidney stone.  Your pain resolved with medications and you were seen by Urology.     Activity    Your activity upon discharge:  activity as tolerated     Follow-up and recommended labs and tests     Follow up with primary care provider, [facility], within 7 days to evaluate medication change and for hospital follow- up.  The following labs/tests are recommended: Basic metabolic panel.    Follow up with Urology (Dr. Morin) in 2-3 weeks and will have a KUB.  Dr. Morin will arrange and the clinic should contact you.     Full Code    Discussed by admitting provider.     Diet    Follow this diet upon discharge: Orders Placed This Encounter      Regular Diet Adult and increase the amount of water you drink (10 eight ounce glass per day).        CONSULTS:     UROLOGY IP CONSULT     LABORATORY IMAGING AND PROCEDURES:   Lab studies that included UA (macroscopic and microscopic), CBC with platelet differential, BMP x2, Magnesium, Potassium.    Imaging studies that included CT abd/pelvis without contrast.       PENDING RESULTS:    None.     PHYSICAL EXAMINATION ON DAY OF DISCHARGE:    Please review progress note as written earlier today.      BRIEF HISTORY OF PRESENT ILLNESS:    Roxanne Machado is a 49 year old female who was admitted on 9/15/2020.     No significant past medical history.  She was registered to observation due to nephrolithiasis with left flank pain and hypokalemia.       HOSPITAL COURSE:     Nephrolithiasis with associated left hydroureter and hydronephrosis:  CT imaging confirms 4 mm calcification in the left ureterovesicular junction with mild left hydroureter and mild left hydronephrosis suggesting recently passed stone.  UA shows small amounts of blood, trace leukocyte esterase, UCx pending.   --Supportive measures including pain control (toradol and low dose dilaudid), IV hydration, antiemetics.  --Start tamsulosin, strain urine.  --Consult urology.     At time of discharge, patient has been seen by Urology and will follow-up with them in 2-3 weeks with a KUB.  She was discharged with 1 month of Flomax,  PRN Zofran and PRN Norco.  Patient was advised to increase PO fluid intake.       TOMASZ:  Creatinine increased (1.21), previously 0.9.  CT scan with very mild left hydroureter and mild left hydronephrosis.    --Continue IV Fluids.    --Avoid nephrotoxic agents.    At time of discharge patient has been advised to call and set up PCP follow-up with BMP recheck.      Mild hypokalemia:  Resolved.    Likely in the setting of decreased oral intake, nausea/vomiting.  Recent Labs   Lab 09/16/20  0018 09/15/20  0802   POTASSIUM 4.0 3.3*   --Monitor and replace per protocol.    COVID-19 Asymptomatic screening:  --PCR ordered but never obtained.      Diet: NPO per Anesthesia Guidelines for Procedure/Surgery Except for: Meds     DVT Prophylaxis: Ambulate every shift   [place] Catheter: not present  Code Status: Full Code        The patient was discussed with Dr. Ham who agrees with discharge at this time.  Dr. Ham will evaluate the patient independently.      TOTAL DISCHARGE TIME:  IHarshil PA-C, personally saw the patient today and spent less than or equal to 30 minutes discharging this patient.    Harshil Roues PA-C  [facility]

## 2020-09-16 NOTE — CONSULTS
"UROLOGY CONSULT BRIEF NOTE:  FULL NOTE TO FOLLOW    Seen and examined  Pain resolved overnight, no clear stone passage    - OK for diet and discharge  - I will arrange for follow up KUB in 2-3 weeks  - Discharge with 1 month of tamsulosin 0.4mg (Flomax)     Usman Morin M.D.     Urology Consult History and Physical    Name: Roxanne Machado    MRN: [id number]   YOB: 1970       We were asked to see Roxanne Machado at the request of Dr. Chaudhary for evaluation and treatment of LEFT ureteral stone.          Chief Complaint:   LEFT ureteral stone    History is obtained from the patient          History of Present Illness:   Roxanne Machado is a 49 year old female who is being seen for evaluation of LEFT ureteral stone.  She presented to the emergency room yesterday with left flank pain, difficulty urinating, nausea and vomiting.  She has no significant past medical history no prior kidney stone history.  Pain started on 9/15/2020.  In the emergency room she had a CT scan which demonstrated a 4 mm UVJ left ureteral stone.  Urinalysis was not concerning for infection.  She was admitted for pain control.    She was seen and examined earlier this afternoon, unfortunately for some reason our team was not notified of the consult yesterday.  She notes that her pain is completely resolved overnight and she is feeling \"great \".  She did not note any stone passage though it is unclear for 100% of her urine was captured and strained.           Past Medical History:   History reviewed. No pertinent past medical history.         Past Surgical History:   History reviewed. No pertinent surgical history.         Social History:     Social History     Tobacco Use     Smoking status: Never Smoker     Smokeless tobacco: Never Used   Substance Use Topics     Alcohol use: Yes     Comment: occasional            Family History:   History reviewed. No pertinent family history.           Allergies:     Allergies "   Allergen Reactions     Morphine      Penicillins             Medications:     No current facility-administered medications for this encounter.      Current Outpatient Medications   Medication Sig     HYDROcodone-acetaminophen (NORCO) 5-325 MG tablet Take 1 tablet by mouth every 6 hours as needed for severe pain     ondansetron (ZOFRAN ODT) 4 MG ODT tab Take 1 tablet (4 mg) by mouth every 8 hours as needed for nausea     tamsulosin (FLOMAX) 0.4 MG capsule Take 1 capsule (0.4 mg) by mouth daily             Review of Systems:    ROS: 10 point ROS neg other than the symptoms noted above in the HPI.          Physical Exam:     Patient Vitals for the past 24 hrs:   BP Temp Temp src Pulse Resp SpO2   09/16/20 1504 118/56 98.1  F (36.7  C) Oral 78 16 99 %   09/16/20 1155 109/53 98.3  F (36.8  C) Oral 70 16 96 %   09/16/20 0757 113/55 98  F (36.7  C) Oral 76 16 95 %   09/16/20 0415 121/76 97.1  F (36.2  C) Oral 62 16 98 %   09/16/20 0026 112/47 97.7  F (36.5  C) Oral 67 16 97 %     General: age-appropriate appearing female in NAD  HEENT: Head AT/NC, EOMI, CN Grossly intact  Lungs: no respiratory distress, or pursed lip breathing  Heart: No obvious jugular venous distension present  Back: no bony midline tenderness, no CVAT bilaterally.  Abdomen: soft, non-distended, non-tender. No organomegaly  Lymph: no palpable inguinal lymphadenopathy.  LE: no edema. pneumoboots in place.  Musculoskeltal: extremities normal, no peripheral edema  Skin: no suspicious lesions or rashes  Neuro: Alert, oriented, speech and mentation normal;  moving all 4 extremities equally.  Psych: affect and mood normal          Data:   All laboratory data reviewed:    Recent Labs   Lab 09/15/20  0802   WBC 9.6   HGB 13.8        Recent Labs   Lab 09/16/20  0711 09/16/20  0018 09/15/20  0802     --  139   POTASSIUM 4.2 4.0 3.3*   CHLORIDE 112*  --  109   CO2 21  --  26   BUN 11  --  16   CR 1.21*  --  0.90   GLC 94  --  147*   ABBEY 7.7*  --  8.5    MAG  --   --  2.2     Recent Labs   Lab 09/15/20  0757   COLOR Yellow   APPEARANCE Clear   URINEGLC Negative   URINEBILI Negative   URINEKETONE Negative   SG 1.030   URINEPH 5.0   PROTEIN Negative   NITRITE Negative   LEUKEST Trace*   RBCU 3*   WBCU 0     IMAGING:  All pertinent imaging reviewed:    All imaging studies reviewed by me.  I personally reviewed these imaging films.  A formal report from radiology will follow.    CT ABD/PEL 9/15/20:  IMPRESSION:   1.  4 mm calcification is noted in the region of the left  ureterovesicular junction which is suspicious for a stone at the left  UVJ. The bladder is decompressed and the ureter is hard to visualize  within this region for definite confirmation. There is mild left  hydroureter and mild left hydronephrosis suggesting a recently passed  stone. 1 mm nonobstructive calculus is noted in the inferior pole of  the left kidney.             Impression and Plan:   Impression:   49-year-old female with a 4 to 5 mm left UPJ stone with complete resolution of symptoms      Plan:   Left 4 to 5 mm UVJ stone  -I reviewed her labs and imaging  -I measure her stone at 5 mm and agree with the 1 mm lower pole stone  -Her serum creatinine did slightly rise today to 1.21 from 0.9 yesterday, however given the dramatic improvement of her clinical picture I think there is a high likelihood she passed her stone overnight.  -We discussed the option for a KUB at this time, however we discussed that even if this demonstrates persistence of the stone, given her significant clinical improvement I would recommend discharge with medical expulsive therapy  -As such, we will plan for discharge on tamsulosin 0.4 mg daily with close follow-up with my office in about 2 weeks with a KUB  -I advised that should she demonstrate return of her flank pain or other symptoms that she notify my office immediately     Usman Morin MD   Urology  [facility]  Clinic Phone  [phone]

## 2020-09-17 ENCOUNTER — TELEPHONE (OUTPATIENT)
Dept: UROLOGY | Facility: CLINIC | Age: 50
End: 2020-09-17

## 2020-09-17 DIAGNOSIS — N20.0 NEPHROLITHIASIS: Primary | ICD-10-CM

## 2020-09-17 NOTE — TELEPHONE ENCOUNTER
----- Message from Susanne Porter sent at 9/17/2020  8:20 AM CDT -----  Regarding: FW: Virtual visit in 2 weeks with KUB prior  Can you please order KUB? Thanks!  ----- Message -----  From: Usman Morin MD  Sent: 9/16/2020   9:29 PM CDT  To: [facility] - Scheduling Pool  Subject: Virtual visit in 2 weeks with KUB prior          Hi team,I would like this patient to have a virtual visit follow-up in 2 weeks with a KUB prior.    Thanks,   Usman Morin M.D.

## 2020-09-18 ENCOUNTER — TELEPHONE (OUTPATIENT)
Dept: UROLOGY | Facility: CLINIC | Age: 50
End: 2020-09-18

## 2020-09-18 NOTE — TELEPHONE ENCOUNTER
"Returned call to Goran. She was seen in consult in the hospital by Dr. Morin 9/16 and is scheduled for follow-up 9/29 in [place].   She reports intermittent (L) side pain, sometimes sharp, now \"eased up\". She's taking Tylenol for pain and advised to keep daily amount to about 3,000 mg. She also has hydrocodone for severe pain. She's taking Flomax and drinking good amounts of water. If pain still not under control she should present to ED. She expressed understanding.  MIGEL Pierre RN       [facility]    Phone Message    May a detailed message be left on voicemail: yes     Reason for Call: Other: per pt- got prescribed pain meds, for severe pain only, tylenol every 3-4 hrs, how safe is it to take the pain pills with tylenol? and shes very concerned on how long the pain will last for, please call goran thanks     Action Taken: Message routed to:  [facility] ([facility] uro    Travel Screening: Not Applicable                                                                      "

## 2020-09-23 DIAGNOSIS — N20.0 NEPHROLITHIASIS: Primary | ICD-10-CM

## 2020-09-24 ENCOUNTER — HOSPITAL ENCOUNTER (OUTPATIENT)
Dept: GENERAL RADIOLOGY | Facility: CLINIC | Age: 50
Discharge: HOME | End: 2020-09-24
Attending: UROLOGY | Admitting: UROLOGY
Payer: COMMERCIAL

## 2020-09-24 DIAGNOSIS — N20.0 NEPHROLITHIASIS: ICD-10-CM

## 2020-09-24 PROCEDURE — 74019 RADEX ABDOMEN 2 VIEWS: CPT
